# Patient Record
Sex: FEMALE | Race: BLACK OR AFRICAN AMERICAN | Employment: OTHER | RURAL
[De-identification: names, ages, dates, MRNs, and addresses within clinical notes are randomized per-mention and may not be internally consistent; named-entity substitution may affect disease eponyms.]

---

## 2023-11-07 ENCOUNTER — OFFICE VISIT (OUTPATIENT)
Facility: CLINIC | Age: 73
End: 2023-11-07
Payer: COMMERCIAL

## 2023-11-07 VITALS
RESPIRATION RATE: 16 BRPM | DIASTOLIC BLOOD PRESSURE: 81 MMHG | WEIGHT: 198 LBS | OXYGEN SATURATION: 94 % | TEMPERATURE: 98.6 F | HEART RATE: 63 BPM | BODY MASS INDEX: 36.44 KG/M2 | SYSTOLIC BLOOD PRESSURE: 138 MMHG | HEIGHT: 62 IN

## 2023-11-07 DIAGNOSIS — C50.012 MALIGNANT NEOPLASM OF AREOLA OF LEFT BREAST IN FEMALE, UNSPECIFIED ESTROGEN RECEPTOR STATUS (HCC): ICD-10-CM

## 2023-11-07 DIAGNOSIS — C50.012 MALIGNANT NEOPLASM OF NIPPLE OF LEFT BREAST IN FEMALE, UNSPECIFIED ESTROGEN RECEPTOR STATUS (HCC): ICD-10-CM

## 2023-11-07 DIAGNOSIS — E11.65 TYPE 2 DIABETES MELLITUS WITH HYPERGLYCEMIA, WITHOUT LONG-TERM CURRENT USE OF INSULIN (HCC): Primary | ICD-10-CM

## 2023-11-07 DIAGNOSIS — E78.2 MIXED HYPERLIPIDEMIA: ICD-10-CM

## 2023-11-07 DIAGNOSIS — I10 PRIMARY HYPERTENSION: ICD-10-CM

## 2023-11-07 DIAGNOSIS — I10 ESSENTIAL HYPERTENSION: ICD-10-CM

## 2023-11-07 DIAGNOSIS — M47.9 ARTHRITIS OF BACK: ICD-10-CM

## 2023-11-07 PROCEDURE — 3044F HG A1C LEVEL LT 7.0%: CPT | Performed by: FAMILY MEDICINE

## 2023-11-07 PROCEDURE — 3078F DIAST BP <80 MM HG: CPT | Performed by: FAMILY MEDICINE

## 2023-11-07 PROCEDURE — 1123F ACP DISCUSS/DSCN MKR DOCD: CPT | Performed by: FAMILY MEDICINE

## 2023-11-07 PROCEDURE — 99203 OFFICE O/P NEW LOW 30 MIN: CPT | Performed by: FAMILY MEDICINE

## 2023-11-07 PROCEDURE — 3074F SYST BP LT 130 MM HG: CPT | Performed by: FAMILY MEDICINE

## 2023-11-07 RX ORDER — FUROSEMIDE 20 MG/1
20 TABLET ORAL DAILY
Qty: 90 TABLET | Refills: 1 | Status: SHIPPED | OUTPATIENT
Start: 2023-11-07

## 2023-11-07 RX ORDER — ANASTROZOLE 1 MG/1
TABLET ORAL
COMMUNITY
Start: 2023-06-30 | End: 2023-11-07 | Stop reason: SDUPTHER

## 2023-11-07 RX ORDER — FUROSEMIDE 20 MG/1
TABLET ORAL
COMMUNITY
Start: 2023-08-08 | End: 2023-11-07 | Stop reason: SDUPTHER

## 2023-11-07 RX ORDER — CLONIDINE HYDROCHLORIDE 0.1 MG/1
TABLET ORAL
COMMUNITY
Start: 2018-05-11 | End: 2023-11-07 | Stop reason: SDUPTHER

## 2023-11-07 RX ORDER — LOSARTAN POTASSIUM AND HYDROCHLOROTHIAZIDE 25; 100 MG/1; MG/1
TABLET ORAL
COMMUNITY
Start: 2018-04-24 | End: 2023-11-07 | Stop reason: SDUPTHER

## 2023-11-07 RX ORDER — CLONIDINE HYDROCHLORIDE 0.1 MG/1
0.1 TABLET ORAL 2 TIMES DAILY
Qty: 180 TABLET | Refills: 1 | Status: SHIPPED | OUTPATIENT
Start: 2023-11-07

## 2023-11-07 RX ORDER — LOSARTAN POTASSIUM AND HYDROCHLOROTHIAZIDE 25; 100 MG/1; MG/1
1 TABLET ORAL DAILY
Qty: 90 TABLET | Refills: 1 | Status: SHIPPED | OUTPATIENT
Start: 2023-11-07

## 2023-11-07 RX ORDER — CYCLOBENZAPRINE HCL 5 MG
TABLET ORAL
COMMUNITY
Start: 2023-10-11

## 2023-11-07 RX ORDER — ANASTROZOLE 1 MG/1
1 TABLET ORAL DAILY
Qty: 90 TABLET | Refills: 1 | Status: SHIPPED | OUTPATIENT
Start: 2023-11-07

## 2023-11-07 SDOH — ECONOMIC STABILITY: HOUSING INSECURITY
IN THE LAST 12 MONTHS, WAS THERE A TIME WHEN YOU DID NOT HAVE A STEADY PLACE TO SLEEP OR SLEPT IN A SHELTER (INCLUDING NOW)?: YES

## 2023-11-07 SDOH — ECONOMIC STABILITY: FOOD INSECURITY: WITHIN THE PAST 12 MONTHS, YOU WORRIED THAT YOUR FOOD WOULD RUN OUT BEFORE YOU GOT MONEY TO BUY MORE.: NEVER TRUE

## 2023-11-07 SDOH — ECONOMIC STABILITY: FOOD INSECURITY: WITHIN THE PAST 12 MONTHS, THE FOOD YOU BOUGHT JUST DIDN'T LAST AND YOU DIDN'T HAVE MONEY TO GET MORE.: NEVER TRUE

## 2023-11-07 SDOH — ECONOMIC STABILITY: INCOME INSECURITY: HOW HARD IS IT FOR YOU TO PAY FOR THE VERY BASICS LIKE FOOD, HOUSING, MEDICAL CARE, AND HEATING?: NOT HARD AT ALL

## 2023-11-07 ASSESSMENT — PATIENT HEALTH QUESTIONNAIRE - PHQ9
1. LITTLE INTEREST OR PLEASURE IN DOING THINGS: 0
SUM OF ALL RESPONSES TO PHQ QUESTIONS 1-9: 0
SUM OF ALL RESPONSES TO PHQ9 QUESTIONS 1 & 2: 0
2. FEELING DOWN, DEPRESSED OR HOPELESS: 0
SUM OF ALL RESPONSES TO PHQ QUESTIONS 1-9: 0

## 2023-11-07 ASSESSMENT — ANXIETY QUESTIONNAIRES
2. NOT BEING ABLE TO STOP OR CONTROL WORRYING: 1
IF YOU CHECKED OFF ANY PROBLEMS ON THIS QUESTIONNAIRE, HOW DIFFICULT HAVE THESE PROBLEMS MADE IT FOR YOU TO DO YOUR WORK, TAKE CARE OF THINGS AT HOME, OR GET ALONG WITH OTHER PEOPLE: NOT DIFFICULT AT ALL
1. FEELING NERVOUS, ANXIOUS, OR ON EDGE: 1

## 2023-11-09 LAB
ALBUMIN SERPL-MCNC: 4.3 G/DL (ref 3.8–4.8)
ALBUMIN/GLOB SERPL: 1.5 {RATIO} (ref 1.2–2.2)
ALP SERPL-CCNC: 121 IU/L (ref 44–121)
ALT SERPL-CCNC: 24 IU/L (ref 0–32)
AST SERPL-CCNC: 23 IU/L (ref 0–40)
BILIRUB SERPL-MCNC: 0.2 MG/DL (ref 0–1.2)
BUN SERPL-MCNC: 13 MG/DL (ref 8–27)
BUN/CREAT SERPL: 16 (ref 12–28)
CALCIUM SERPL-MCNC: 10.8 MG/DL (ref 8.7–10.3)
CHLORIDE SERPL-SCNC: 101 MMOL/L (ref 96–106)
CHOLEST SERPL-MCNC: 152 MG/DL (ref 100–199)
CO2 SERPL-SCNC: 27 MMOL/L (ref 20–29)
CREAT SERPL-MCNC: 0.81 MG/DL (ref 0.57–1)
EGFRCR SERPLBLD CKD-EPI 2021: 76 ML/MIN/1.73
GLOBULIN SER CALC-MCNC: 2.9 G/DL (ref 1.5–4.5)
GLUCOSE SERPL-MCNC: 94 MG/DL (ref 70–99)
HBA1C MFR BLD: 6.4 % (ref 4.8–5.6)
HDLC SERPL-MCNC: 40 MG/DL
IMP & REVIEW OF LAB RESULTS: NORMAL
LDLC SERPL CALC-MCNC: 88 MG/DL (ref 0–99)
POTASSIUM SERPL-SCNC: 3.6 MMOL/L (ref 3.5–5.2)
PROT SERPL-MCNC: 7.2 G/DL (ref 6–8.5)
SODIUM SERPL-SCNC: 144 MMOL/L (ref 134–144)
SPECIMEN STATUS REPORT: NORMAL
TRIGL SERPL-MCNC: 134 MG/DL (ref 0–149)
VLDLC SERPL CALC-MCNC: 24 MG/DL (ref 5–40)

## 2023-11-10 PROBLEM — C50.912 BREAST CANCER, LEFT BREAST (HCC): Status: ACTIVE | Noted: 2023-11-10

## 2023-11-10 PROBLEM — I10 PRIMARY HYPERTENSION: Status: ACTIVE | Noted: 2023-11-10

## 2023-11-13 ENCOUNTER — CLINICAL DOCUMENTATION (OUTPATIENT)
Age: 73
End: 2023-11-13

## 2023-11-13 NOTE — PROGRESS NOTES
Reached out to patient to clarify reason for referral, maybe looking for an oncologist. She is going to call Dr. Bharath Schmidt office and call back.

## 2023-11-14 ENCOUNTER — OFFICE VISIT (OUTPATIENT)
Age: 73
End: 2023-11-14
Payer: COMMERCIAL

## 2023-11-14 VITALS
HEART RATE: 48 BPM | OXYGEN SATURATION: 92 % | SYSTOLIC BLOOD PRESSURE: 118 MMHG | HEIGHT: 62 IN | TEMPERATURE: 97.8 F | BODY MASS INDEX: 37.21 KG/M2 | DIASTOLIC BLOOD PRESSURE: 66 MMHG | WEIGHT: 202.2 LBS | RESPIRATION RATE: 14 BRPM

## 2023-11-14 DIAGNOSIS — C50.412 CARCINOMA OF UPPER-OUTER QUADRANT OF LEFT BREAST IN FEMALE, ESTROGEN RECEPTOR POSITIVE (HCC): Primary | ICD-10-CM

## 2023-11-14 DIAGNOSIS — Z17.0 CARCINOMA OF UPPER-OUTER QUADRANT OF LEFT BREAST IN FEMALE, ESTROGEN RECEPTOR POSITIVE (HCC): Primary | ICD-10-CM

## 2023-11-14 PROCEDURE — 3078F DIAST BP <80 MM HG: CPT | Performed by: SURGERY

## 2023-11-14 PROCEDURE — 3074F SYST BP LT 130 MM HG: CPT | Performed by: SURGERY

## 2023-11-14 PROCEDURE — 99244 OFF/OP CNSLTJ NEW/EST MOD 40: CPT | Performed by: SURGERY

## 2023-11-14 ASSESSMENT — PATIENT HEALTH QUESTIONNAIRE - PHQ9
2. FEELING DOWN, DEPRESSED OR HOPELESS: 0
1. LITTLE INTEREST OR PLEASURE IN DOING THINGS: 0
SUM OF ALL RESPONSES TO PHQ QUESTIONS 1-9: 0
SUM OF ALL RESPONSES TO PHQ QUESTIONS 1-9: 0
SUM OF ALL RESPONSES TO PHQ9 QUESTIONS 1 & 2: 0
SUM OF ALL RESPONSES TO PHQ QUESTIONS 1-9: 0
SUM OF ALL RESPONSES TO PHQ QUESTIONS 1-9: 0

## 2023-11-14 ASSESSMENT — ENCOUNTER SYMPTOMS
CONSTIPATION: 0
VOMITING: 0
COUGH: 0
EYE PAIN: 0
DIARRHEA: 0
SORE THROAT: 0
STRIDOR: 0
APNEA: 1
WHEEZING: 0
SHORTNESS OF BREATH: 0
NAUSEA: 0
BACK PAIN: 1
BLOOD IN STOOL: 0
ABDOMINAL PAIN: 0

## 2023-11-14 NOTE — PROGRESS NOTES
Subjective:      Patient ID: Jasmin Keating is a 75 y.o. female who comes in for consultation by Ronnie Quiroz MD to establish care for a hx breast cancer      Chief Complaint   Patient presents with    New Patient     Seen at the request of Dr. Siobhan Beck for pain in the left breast, hx of breast cancer. HPI    She was noted on mammogram to have left breast changes in 2021. She underwent bx and subsequent left lumpectomy and SLNB 6/2021 for a Y5qL8X4 % % her2/danitza 0 mixed IDC/ILC Grade 3 carcinoma. She received adjuvant WBRT and is taking anastrozole. She reports some tenderness over the lumpectomy/SLNB sites but no other masses, skin changes, nipple changes or drainage. She had menarche at 14, only pregnancy at 38, menopause in late 45s and did not take HRT. She denies family hx breast/ovarian/pancreatic/prostate ca. Past Medical History:   Diagnosis Date    Prediabetes 2023    Sleep apnea      Past Surgical History:   Procedure Laterality Date    HYSTERECTOMY, VAGINAL       History reviewed. No pertinent family history. Social History     Tobacco Use    Smoking status: Never     Passive exposure: Never    Smokeless tobacco: Never   Vaping Use    Vaping Use: Never used   Substance Use Topics    Alcohol use: Yes     Comment: occ    Drug use: Never     Current Outpatient Medications   Medication Sig    cyclobenzaprine (FLEXERIL) 5 MG tablet     anastrozole (ARIMIDEX) 1 MG tablet Take 1 tablet by mouth daily    losartan-hydroCHLOROthiazide (HYZAAR) 100-25 MG per tablet Take 1 tablet by mouth daily    cloNIDine (CATAPRES) 0.1 MG tablet Take 1 tablet by mouth 2 times daily    furosemide (LASIX) 20 MG tablet Take 1 tablet by mouth daily    diclofenac sodium (VOLTAREN) 1 % GEL Apply 4 g topically (Patient not taking: Reported on 11/14/2023)     No current facility-administered medications for this visit.      Allergies   Allergen Reactions    Amlodipine Besylate      Marked edema per PN dated

## 2023-11-21 ENCOUNTER — TELEPHONE (OUTPATIENT)
Facility: CLINIC | Age: 73
End: 2023-11-21

## 2023-11-21 NOTE — TELEPHONE ENCOUNTER
----- Message from Reema oWodward sent at 11/13/2023  9:59 AM EST -----  Subject: Message to Provider    QUESTIONS  Information for Provider? pt states she is needing a mammogram before her   appt with gernal surgery on the 14th but pt is confused on why she is   seeing surgery an not oncology   ---------------------------------------------------------------------------  --------------  CALL BACK INFO  [phone]; OK to leave message on voicemail  ---------------------------------------------------------------------------  --------------  SCRIPT ANSWERS  Relationship to Patient? Self

## 2023-11-22 DIAGNOSIS — C50.012 MALIGNANT NEOPLASM OF NIPPLE OF LEFT BREAST IN FEMALE, UNSPECIFIED ESTROGEN RECEPTOR STATUS (HCC): Primary | ICD-10-CM

## 2023-12-08 ENCOUNTER — TELEPHONE (OUTPATIENT)
Facility: CLINIC | Age: 73
End: 2023-12-08

## 2024-01-05 ENCOUNTER — CLINICAL DOCUMENTATION (OUTPATIENT)
Facility: CLINIC | Age: 74
End: 2024-01-05

## 2024-01-19 NOTE — PROGRESS NOTES
Velia Mcintyre is a 73 y.o. female here for new patient appt for left breast cancer.  Diagnosed in 2021. Having tenderness in left breast. She is taking Anastrozole.  Mammogram done 1/3/24.   Pt here alone today.    1. Have you been to the ER, urgent care clinic since your last visit?  Hospitalized since your last visit?  New Pt    2. Have you seen or consulted any other health care providers outside of the [facility] since your last visit?  Include any pap smears or colon screening. New Pt

## 2024-01-22 ENCOUNTER — OFFICE VISIT (OUTPATIENT)
Age: 74
End: 2024-01-22
Payer: COMMERCIAL

## 2024-01-22 VITALS
HEART RATE: 66 BPM | BODY MASS INDEX: 37.94 KG/M2 | OXYGEN SATURATION: 95 % | HEIGHT: 62 IN | SYSTOLIC BLOOD PRESSURE: 168 MMHG | TEMPERATURE: 98.2 F | DIASTOLIC BLOOD PRESSURE: 82 MMHG | WEIGHT: 206.2 LBS

## 2024-01-22 DIAGNOSIS — C50.012 MALIGNANT NEOPLASM OF AREOLA OF LEFT BREAST IN FEMALE, UNSPECIFIED ESTROGEN RECEPTOR STATUS (HCC): Primary | ICD-10-CM

## 2024-01-22 DIAGNOSIS — M81.0 AGE-RELATED OSTEOPOROSIS WITHOUT CURRENT PATHOLOGICAL FRACTURE: ICD-10-CM

## 2024-01-22 PROCEDURE — 3079F DIAST BP 80-89 MM HG: CPT | Performed by: INTERNAL MEDICINE

## 2024-01-22 PROCEDURE — 3077F SYST BP >= 140 MM HG: CPT | Performed by: INTERNAL MEDICINE

## 2024-01-22 PROCEDURE — 99204 OFFICE O/P NEW MOD 45 MIN: CPT | Performed by: INTERNAL MEDICINE

## 2024-01-22 PROCEDURE — 1123F ACP DISCUSS/DSCN MKR DOCD: CPT | Performed by: INTERNAL MEDICINE

## 2024-01-22 RX ORDER — ANASTROZOLE 1 MG/1
1 TABLET ORAL DAILY
Qty: 90 TABLET | Refills: 5 | Status: SHIPPED | OUTPATIENT
Start: 2024-01-22

## 2024-01-22 NOTE — PROGRESS NOTES
[facility]  at [facility]  [address], [address] [facility] [address], [address]  [place] [address]  [phone]      Oncology Consultation Note        Patient: Velia Mcintyre MRN: [id number]  SSN: [id number]    YOB: 1950  Age: 73 y.o.  Sex: female      Subjective:      Velia Mcintyre is a 73 y.o. female who I am establishing her care for left breast. She was treated at [facility]. She was noted on mammogram to have left breast changes in 2021.  She underwent bx and subsequent left lumpectomy and SLNB 6/2021 for a H4qI1Y6 % % her2/danitza 0 mixed IDC/ILC Grade 3 carcinoma.   She received adjuvant WBRT and is taking anastrozole.      She reports some tenderness over the lumpectomy/SLNB sites but no other masses, skin changes, nipple changes or drainage.  She had menarche at 13, only pregnancy at 35, menopause in late 40s and did not take HRT.  She denies family hx breast/ovarian/pancreatic/prostate ca.       Review of Systems:    Constitutional: negative  Eyes: negative  Ears, Nose, Mouth, Throat, and Face: negative  Respiratory: negative  Cardiovascular: negative  Gastrointestinal: negative  Genitourinary:negative  Integument/Breast: negative  Hematologic/Lymphatic: negative  Musculoskeletal:negative  Neurological: negative        Past Medical History:   Diagnosis Date    Breast cancer (HCC) 2021    Prediabetes 2023    Sleep apnea      Past Surgical History:   Procedure Laterality Date    HYSTERECTOMY, VAGINAL        History reviewed. No pertinent family history.  Social History     Tobacco Use    Smoking status: Never     Passive exposure: Never    Smokeless tobacco: Never   Substance Use Topics    Alcohol use: Yes     Comment: occ      Prior to Admission medications    Medication Sig Start Date End Date Taking? Authorizing Provider   anastrozole (ARIMIDEX) 1 MG tablet Take 1 tablet by mouth daily 11/7/23  Yes Baldomero Stinson MD

## 2024-01-23 ENCOUNTER — CLINICAL DOCUMENTATION (OUTPATIENT)
Age: 74
End: 2024-01-23

## 2024-01-23 NOTE — PROGRESS NOTES
Oncology Social Work  Psychosocial Assessment    Reason for Assessment:      [] Social Work Referral [x] Initial Assessment [] New Diagnosis [x] Other    [facility]/23/2024    12:00 PM   Demographics   Marital Status Single       Sources of Information:    [x]Patient  []Family  [x]Staff  [x]Medical Record    Mental Status:    [x]Alert  []Lethargic  []Unresponsive   [] Unable to assess   Oriented to:  [x]Person  [x]Place  [x]Time  [x]Situation      Relationship Status:  []Single  []  []Significant Other/Life Partner  [x]  []  []    Living Circumstances:  [x]Lives Alone  []Family/Significant Other in Household  []Roommates  []Children in the Home  []Paid Caregivers  []Assisted Living Facility/Group Home  []Skilled Nursing Facility  []Homeless  []Incarcerated  []Environmental/Care Concerns  []Other:    Employment Status:  []Employed Full-time []Employed Part-time []Homemaker  [x] Retired [] Short-Term Disability [] Long-Term Disability  [] Unemployed   [] SSI   [] SSDI  [] Self-Employment    Barriers to Learning:    []Language  []Developmental  []Cognitive  []Altered Mental Status  []Visual/Hearing Impairment  []Unable to Read/Write  []Motivational  [] Challenges Understanding Medical Jargon [x]No Barriers Identified      Financial/Legal Concerns:    []Uninsured  []Limited Income/Resources  []Non-Citizen  []Food Insecurity [x]No Concerns Identified   []Other:    Congregation/Spiritual/Existential:  Does patient have any spiritual or Mormon beliefs? [x] Yes [] No  Is the patient involved in a spiritual, dylan or Mormon community? [x] Yes [] No  Patient expressing spiritual/existential angst? [] Yes [x] No  Notes:    Support System:    Identified Support Person/Group:  []Strong  [x]Fair  []Limited    Coping with Illness:   []  Coping Well  [] Challenges Coping with Serious Illness [] Situational Depression [] Situational Anxiety [] Anticipatory Grief  [] Recent Loss []

## 2024-01-28 NOTE — PROGRESS NOTES
Subjective:     Velia Mcintyre is a 73 y.o. female seen for follow-up of diabetes.     She has had hypoglycemic attacks. .no  Blood sugar control has been ok    Hemoglobin A1C   Date Value Ref Range Status   11/08/2023 6.4 (H) 4.8 - 5.6 % Final     Comment:              Prediabetes: 5.7 - 6.4           Diabetes: >6.4           Glycemic control for adults with diabetes: <7.0       Lab Results   Component Value Date/Time     11/08/2023 01:35 PM    K 3.6 11/08/2023 01:35 PM     11/08/2023 01:35 PM    CO2 27 11/08/2023 01:35 PM    BUN 13 11/08/2023 01:35 PM    CREATININE 0.81 11/08/2023 01:35 PM    GLUCOSE 94 11/08/2023 01:35 PM    CALCIUM 10.8 11/08/2023 01:35 PM    LABGLOM 76 11/08/2023 01:35 PM          She has diabetes, hypertension, and hyperlipidemia.    Velai Mcintyre has the additional concern of fell x2 walking couldn't stop toppled over recliner, pain in right knee since    Reports taking blood pressure medications without side affects. No complaints of exertional chest pain, excessive shortness of breath or focal weakness. Minimal swelling in lower legs or dizziness with standing.      Diet and Lifestyle: generally follows a low fat low cholesterol diet, low-carb .    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, estrogen receptor positive (HCC) 11/14/2023    Primary hypertension 11/10/2023    Breast cancer, left breast (HCC) 11/10/2023    Type 2 diabetes mellitus with hyperglycemia, without long-term current use of insulin (HCC) 11/07/2023     Allergies   Allergen Reactions    Amlodipine Besylate      Marked edema per PN dated October 8, 2013     Past Medical History:   Diagnosis Date    Breast cancer (HCC) 2021    Prediabetes 2023    Sleep apnea      Social History     Tobacco Use    Smoking status: Never     Passive exposure: Never    Smokeless tobacco: Never   Substance Use Topics    Alcohol use: Yes     Comment: occ

## 2024-01-29 ENCOUNTER — OFFICE VISIT (OUTPATIENT)
Facility: CLINIC | Age: 74
End: 2024-01-29
Payer: COMMERCIAL

## 2024-01-29 VITALS
RESPIRATION RATE: 16 BRPM | TEMPERATURE: 97.4 F | HEART RATE: 52 BPM | WEIGHT: 206 LBS | OXYGEN SATURATION: 95 % | DIASTOLIC BLOOD PRESSURE: 76 MMHG | HEIGHT: 62 IN | SYSTOLIC BLOOD PRESSURE: 139 MMHG | BODY MASS INDEX: 37.91 KG/M2

## 2024-01-29 DIAGNOSIS — E11.65 TYPE 2 DIABETES MELLITUS WITH HYPERGLYCEMIA, WITHOUT LONG-TERM CURRENT USE OF INSULIN (HCC): Primary | ICD-10-CM

## 2024-01-29 DIAGNOSIS — M47.9 ARTHRITIS OF BACK: ICD-10-CM

## 2024-01-29 DIAGNOSIS — I10 PRIMARY HYPERTENSION: ICD-10-CM

## 2024-01-29 DIAGNOSIS — C50.012 MALIGNANT NEOPLASM OF NIPPLE OF LEFT BREAST IN FEMALE, UNSPECIFIED ESTROGEN RECEPTOR STATUS (HCC): ICD-10-CM

## 2024-01-29 DIAGNOSIS — S89.91XA KNEE INJURY, RIGHT, INITIAL ENCOUNTER: ICD-10-CM

## 2024-01-29 PROCEDURE — 3078F DIAST BP <80 MM HG: CPT | Performed by: FAMILY MEDICINE

## 2024-01-29 PROCEDURE — 99214 OFFICE O/P EST MOD 30 MIN: CPT | Performed by: FAMILY MEDICINE

## 2024-01-29 PROCEDURE — 1123F ACP DISCUSS/DSCN MKR DOCD: CPT | Performed by: FAMILY MEDICINE

## 2024-01-29 PROCEDURE — 3075F SYST BP GE 130 - 139MM HG: CPT | Performed by: FAMILY MEDICINE

## 2024-01-31 LAB — HBA1C MFR BLD HPLC: 6 %

## 2024-02-01 ENCOUNTER — TELEPHONE (OUTPATIENT)
Facility: CLINIC | Age: 74
End: 2024-02-01

## 2024-02-01 NOTE — TELEPHONE ENCOUNTER
----- Message from Alejandro Martínez sent at 2/1/2024 11:41 AM EST -----  Subject: Results Request    QUESTIONS  Results: X-RAY RIGHT KNEE- IN PAIN, WANTING TO KNOW NEXT STEP; Ordered by:   Baldomero Stinson   Date Performed: 2024-01-31  ---------------------------------------------------------------------------  --------------  CALL BACK INFO    [phone]; OK to leave message on voicemail  ---------------------------------------------------------------------------  --------------

## 2024-02-06 ENCOUNTER — TELEPHONE (OUTPATIENT)
Facility: CLINIC | Age: 74
End: 2024-02-06

## 2024-03-21 DIAGNOSIS — E11.65 TYPE 2 DIABETES MELLITUS WITH HYPERGLYCEMIA, WITHOUT LONG-TERM CURRENT USE OF INSULIN (HCC): ICD-10-CM

## 2024-03-21 DIAGNOSIS — I10 PRIMARY HYPERTENSION: ICD-10-CM

## 2024-04-04 ENCOUNTER — HOSPITAL ENCOUNTER (OUTPATIENT)
Facility: HOSPITAL | Age: 74
Discharge: HOME | End: 2024-04-04
Attending: INTERNAL MEDICINE
Payer: COMMERCIAL

## 2024-04-04 DIAGNOSIS — M81.0 AGE-RELATED OSTEOPOROSIS WITHOUT CURRENT PATHOLOGICAL FRACTURE: ICD-10-CM

## 2024-04-04 PROCEDURE — 77080 DXA BONE DENSITY AXIAL: CPT

## 2024-04-08 NOTE — PROGRESS NOTES
Subjective:     Velia Mcintyre is a 73 y.o. female seen for follow-up of diabetes.     She has had hypoglycemic attacks. .no  Blood sugar control has been asks about her DM, currently diet control    Hemoglobin A1C   Date Value Ref Range Status   11/08/2023 6.4 (H) 4.8 - 5.6 % Final     Comment:              Prediabetes: 5.7 - 6.4           Diabetes: >6.4           Glycemic control for adults with diabetes: <7.0       Lab Results   Component Value Date/Time     11/08/2023 01:35 PM    K 3.6 11/08/2023 01:35 PM     11/08/2023 01:35 PM    CO2 27 11/08/2023 01:35 PM    BUN 13 11/08/2023 01:35 PM    CREATININE 0.81 11/08/2023 01:35 PM    GLUCOSE 94 11/08/2023 01:35 PM    CALCIUM 10.8 11/08/2023 01:35 PM    LABGLOM 76 11/08/2023 01:35 PM          She has diabetes and hypertension.    Velia Mcintyre has the additional concern of what can I do to help me with DM?    Reports taking blood pressure medications without side affects. No complaints of exertional chest pain, excessive shortness of breath or focal weakness. Some swelling in lower legs no dizziness with standing.      Diet and Lifestyle: exercises regularly, watches carbs .    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, estrogen receptor positive (HCC) 11/14/2023    Primary hypertension 11/10/2023    Breast cancer, left breast (HCC) 11/10/2023    Type 2 diabetes mellitus with hyperglycemia, without long-term current use of insulin (HCC) 11/07/2023     Allergies   Allergen Reactions    Amlodipine Besylate      Marked edema per PN dated October 8, 2013     Past Medical History:   Diagnosis Date    Breast cancer (HCC) 2021    Prediabetes 2023    Sleep apnea      Social History     Tobacco Use    Smoking status: Never     Passive exposure: Never    Smokeless tobacco: Never   Substance Use Topics    Alcohol use: Yes     Comment: occ             Review of Systems  Pertinent items are noted

## 2024-04-09 ENCOUNTER — OFFICE VISIT (OUTPATIENT)
Facility: CLINIC | Age: 74
End: 2024-04-09
Payer: COMMERCIAL

## 2024-04-09 VITALS
OXYGEN SATURATION: 95 % | BODY MASS INDEX: 37.36 KG/M2 | HEART RATE: 65 BPM | WEIGHT: 203 LBS | SYSTOLIC BLOOD PRESSURE: 132 MMHG | HEIGHT: 62 IN | TEMPERATURE: 97.7 F | RESPIRATION RATE: 16 BRPM | DIASTOLIC BLOOD PRESSURE: 72 MMHG

## 2024-04-09 DIAGNOSIS — I10 ESSENTIAL HYPERTENSION: ICD-10-CM

## 2024-04-09 DIAGNOSIS — E11.65 TYPE 2 DIABETES MELLITUS WITH HYPERGLYCEMIA, WITHOUT LONG-TERM CURRENT USE OF INSULIN (HCC): Primary | ICD-10-CM

## 2024-04-09 DIAGNOSIS — I10 PRIMARY HYPERTENSION: ICD-10-CM

## 2024-04-09 DIAGNOSIS — C50.412 CARCINOMA OF UPPER-OUTER QUADRANT OF LEFT BREAST IN FEMALE, ESTROGEN RECEPTOR POSITIVE (HCC): ICD-10-CM

## 2024-04-09 DIAGNOSIS — Z17.0 CARCINOMA OF UPPER-OUTER QUADRANT OF LEFT BREAST IN FEMALE, ESTROGEN RECEPTOR POSITIVE (HCC): ICD-10-CM

## 2024-04-09 PROCEDURE — 1123F ACP DISCUSS/DSCN MKR DOCD: CPT | Performed by: FAMILY MEDICINE

## 2024-04-09 PROCEDURE — G2211 COMPLEX E/M VISIT ADD ON: HCPCS | Performed by: FAMILY MEDICINE

## 2024-04-09 PROCEDURE — 3075F SYST BP GE 130 - 139MM HG: CPT | Performed by: FAMILY MEDICINE

## 2024-04-09 PROCEDURE — 99214 OFFICE O/P EST MOD 30 MIN: CPT | Performed by: FAMILY MEDICINE

## 2024-04-09 PROCEDURE — 3044F HG A1C LEVEL LT 7.0%: CPT | Performed by: FAMILY MEDICINE

## 2024-04-09 PROCEDURE — 3078F DIAST BP <80 MM HG: CPT | Performed by: FAMILY MEDICINE

## 2024-04-09 RX ORDER — CLONIDINE HYDROCHLORIDE 0.1 MG/1
0.1 TABLET ORAL 2 TIMES DAILY
Qty: 180 TABLET | Refills: 3 | Status: SHIPPED | OUTPATIENT
Start: 2024-04-09

## 2024-04-09 RX ORDER — FUROSEMIDE 20 MG/1
20 TABLET ORAL DAILY
Qty: 90 TABLET | Refills: 3 | Status: SHIPPED | OUTPATIENT
Start: 2024-04-09

## 2024-04-09 RX ORDER — LOSARTAN POTASSIUM AND HYDROCHLOROTHIAZIDE 25; 100 MG/1; MG/1
1 TABLET ORAL DAILY
Qty: 90 TABLET | Refills: 3 | Status: SHIPPED | OUTPATIENT
Start: 2024-04-09

## 2024-04-09 SDOH — ECONOMIC STABILITY: FOOD INSECURITY: WITHIN THE PAST 12 MONTHS, YOU WORRIED THAT YOUR FOOD WOULD RUN OUT BEFORE YOU GOT MONEY TO BUY MORE.: NEVER TRUE

## 2024-04-09 SDOH — ECONOMIC STABILITY: INCOME INSECURITY: HOW HARD IS IT FOR YOU TO PAY FOR THE VERY BASICS LIKE FOOD, HOUSING, MEDICAL CARE, AND HEATING?: NOT HARD AT ALL

## 2024-04-09 SDOH — ECONOMIC STABILITY: FOOD INSECURITY: WITHIN THE PAST 12 MONTHS, THE FOOD YOU BOUGHT JUST DIDN'T LAST AND YOU DIDN'T HAVE MONEY TO GET MORE.: NEVER TRUE

## 2024-04-09 SDOH — ECONOMIC STABILITY: HOUSING INSECURITY
IN THE LAST 12 MONTHS, WAS THERE A TIME WHEN YOU DID NOT HAVE A STEADY PLACE TO SLEEP OR SLEPT IN A SHELTER (INCLUDING NOW)?: NO

## 2024-04-09 ASSESSMENT — PATIENT HEALTH QUESTIONNAIRE - PHQ9
2. FEELING DOWN, DEPRESSED OR HOPELESS: SEVERAL DAYS
1. LITTLE INTEREST OR PLEASURE IN DOING THINGS: SEVERAL DAYS
SUM OF ALL RESPONSES TO PHQ QUESTIONS 1-9: 2
SUM OF ALL RESPONSES TO PHQ QUESTIONS 1-9: 2
SUM OF ALL RESPONSES TO PHQ9 QUESTIONS 1 & 2: 2
SUM OF ALL RESPONSES TO PHQ QUESTIONS 1-9: 2
SUM OF ALL RESPONSES TO PHQ QUESTIONS 1-9: 2

## 2024-04-09 ASSESSMENT — ANXIETY QUESTIONNAIRES
2. NOT BEING ABLE TO STOP OR CONTROL WORRYING: SEVERAL DAYS
1. FEELING NERVOUS, ANXIOUS, OR ON EDGE: SEVERAL DAYS
IF YOU CHECKED OFF ANY PROBLEMS ON THIS QUESTIONNAIRE, HOW DIFFICULT HAVE THESE PROBLEMS MADE IT FOR YOU TO DO YOUR WORK, TAKE CARE OF THINGS AT HOME, OR GET ALONG WITH OTHER PEOPLE: NOT DIFFICULT AT ALL

## 2024-04-09 NOTE — PROGRESS NOTES
Chief Complaint   Patient presents with    Diabetes    Hypertension     Patient has not been out of the country in (14 months), NO diarrhea, NO cough, NO chest conjestion, NO temp.  Pt has not been around anyone with these symptoms.     Health Maintenance reviewed.    I have reviewed the patient's medical history in detail and updated the computerized patient record.    \"Have you been to the ER, urgent care clinic since your last visit? No  Hospitalized since your last visit?\"    no    “Have you seen or consulted any other health care providers outside of [facility] since your last visit?”    no

## 2024-04-10 LAB
BUN SERPL-MCNC: 14 MG/DL (ref 8–27)
BUN/CREAT SERPL: 16 (ref 12–28)
CALCIUM SERPL-MCNC: 10.7 MG/DL (ref 8.7–10.3)
CHLORIDE SERPL-SCNC: 105 MMOL/L (ref 96–106)
CO2 SERPL-SCNC: 24 MMOL/L (ref 20–29)
CREAT SERPL-MCNC: 0.86 MG/DL (ref 0.57–1)
EGFRCR SERPLBLD CKD-EPI 2021: 71 ML/MIN/1.73
GLUCOSE SERPL-MCNC: 93 MG/DL (ref 70–99)
HBA1C MFR BLD: 6.4 % (ref 4.8–5.6)
POTASSIUM SERPL-SCNC: 4.2 MMOL/L (ref 3.5–5.2)
SODIUM SERPL-SCNC: 143 MMOL/L (ref 134–144)

## 2024-08-27 DIAGNOSIS — M47.9 ARTHRITIS OF BACK: ICD-10-CM

## 2024-08-27 PROCEDURE — 72100 X-RAY EXAM L-S SPINE 2/3 VWS: CPT | Performed by: FAMILY MEDICINE

## 2024-10-04 ENCOUNTER — TELEPHONE (OUTPATIENT)
Age: 74
End: 2024-10-04

## 2024-10-04 NOTE — TELEPHONE ENCOUNTER
Patient has an appt to see Dr Vargas on 10/9/2024. She was supposed to have a mammogram in June 2024, I called her to see if she has had this done somewhere else. If not I will see if Dr Vargas would like to put a order in and let her get the Mammo done before seeing her or if he would like for her to come to her appt on 10/9/24 then order the mammo.   I left a message for her to call me back.

## 2024-10-30 ENCOUNTER — TELEPHONE (OUTPATIENT)
Age: 74
End: 2024-10-30

## 2024-10-30 NOTE — TELEPHONE ENCOUNTER
Spoke with patient she was just released from the hospital. She said she was admitted for 6 days d/t SOB, her HR and BP. She wanted to cancel her appt for Friday until she can get a little stronger. She rescheduled for 11/26/24. She had a mammo on 1/3/2024 at [facility] results are in EPIC. I will speak with Dr Vargas to see if she needs another Mammogram done prior to her appt and let her know.

## 2024-10-31 DIAGNOSIS — S89.91XA KNEE INJURY, RIGHT, INITIAL ENCOUNTER: ICD-10-CM

## 2024-10-31 NOTE — TELEPHONE ENCOUNTER
Left message for patient letting her know that Dr Vargas would like to see her in the office before placing orders for imaging

## 2024-11-01 NOTE — TELEPHONE ENCOUNTER
Patient made aware that Dr Vargas wants to see her before placing any imaging orders, she said she understood. I reminded her that her appt is on 11/26/24 @ 10am

## 2025-03-25 ENCOUNTER — OFFICE VISIT (OUTPATIENT)
Age: 75
End: 2025-03-25
Payer: COMMERCIAL

## 2025-03-25 VITALS
WEIGHT: 197.2 LBS | OXYGEN SATURATION: 95 % | HEART RATE: 56 BPM | RESPIRATION RATE: 16 BRPM | TEMPERATURE: 97.3 F | BODY MASS INDEX: 36.29 KG/M2 | DIASTOLIC BLOOD PRESSURE: 69 MMHG | SYSTOLIC BLOOD PRESSURE: 116 MMHG | HEIGHT: 62 IN

## 2025-03-25 DIAGNOSIS — C50.412 CARCINOMA OF UPPER-OUTER QUADRANT OF LEFT BREAST IN FEMALE, ESTROGEN RECEPTOR POSITIVE: Primary | ICD-10-CM

## 2025-03-25 DIAGNOSIS — Z17.0 CARCINOMA OF UPPER-OUTER QUADRANT OF LEFT BREAST IN FEMALE, ESTROGEN RECEPTOR POSITIVE: Primary | ICD-10-CM

## 2025-03-25 PROCEDURE — 3078F DIAST BP <80 MM HG: CPT | Performed by: SURGERY

## 2025-03-25 PROCEDURE — 1123F ACP DISCUSS/DSCN MKR DOCD: CPT | Performed by: SURGERY

## 2025-03-25 PROCEDURE — 99213 OFFICE O/P EST LOW 20 MIN: CPT | Performed by: SURGERY

## 2025-03-25 PROCEDURE — 3074F SYST BP LT 130 MM HG: CPT | Performed by: SURGERY

## 2025-03-25 RX ORDER — FUROSEMIDE 40 MG/1
40 TABLET ORAL DAILY
COMMUNITY
Start: 2025-02-23

## 2025-03-25 RX ORDER — TELMISARTAN 80 MG/1
80 TABLET ORAL DAILY
COMMUNITY
Start: 2025-01-31

## 2025-03-25 RX ORDER — CARVEDILOL 3.12 MG/1
3.12 TABLET ORAL 2 TIMES DAILY WITH MEALS
COMMUNITY
Start: 2025-02-23

## 2025-03-25 RX ORDER — ACETAMINOPHEN 500 MG
1000 TABLET ORAL EVERY 6 HOURS PRN
COMMUNITY

## 2025-03-25 RX ORDER — DOXAZOSIN 4 MG/1
4 TABLET ORAL NIGHTLY
COMMUNITY

## 2025-03-25 RX ORDER — SPIRONOLACTONE 25 MG/1
25 TABLET ORAL DAILY
COMMUNITY
Start: 2025-03-10

## 2025-03-25 RX ORDER — LOSARTAN POTASSIUM 50 MG/1
50 TABLET ORAL DAILY
COMMUNITY
Start: 2025-01-03

## 2025-03-25 RX ORDER — LABETALOL 200 MG/1
200 TABLET, FILM COATED ORAL 2 TIMES DAILY
COMMUNITY

## 2025-03-25 ASSESSMENT — PATIENT HEALTH QUESTIONNAIRE - PHQ9
1. LITTLE INTEREST OR PLEASURE IN DOING THINGS: NOT AT ALL
SUM OF ALL RESPONSES TO PHQ QUESTIONS 1-9: 0
2. FEELING DOWN, DEPRESSED OR HOPELESS: NOT AT ALL
SUM OF ALL RESPONSES TO PHQ QUESTIONS 1-9: 0

## 2025-03-25 ASSESSMENT — ENCOUNTER SYMPTOMS
BACK PAIN: 1
WHEEZING: 0
COUGH: 0
EYE PAIN: 0
NAUSEA: 0
BLOOD IN STOOL: 0
APNEA: 1
SHORTNESS OF BREATH: 1
VOMITING: 0
CONSTIPATION: 0
STRIDOR: 0
SORE THROAT: 0
ABDOMINAL PAIN: 0
DIARRHEA: 0

## 2025-03-25 NOTE — PROGRESS NOTES
Identified pt with two pt identifiers (name and ). Reviewed chart in preparation for visit and have obtained necessary documentation.    Velia Mcintyre is a 74 y.o. female Follow-up (Follow up left breast cancer.)  .    Vitals:    25 1129   BP: 116/69   BP Site: Right Upper Arm   Patient Position: Sitting   BP Cuff Size: Large Adult   Pulse: 56   Resp: 16   Temp: 97.3 °F (36.3 °C)   TempSrc: Oral   SpO2: 95%   Weight: 89.4 kg (197 lb 3.2 oz)   Height: 1.562 m (5' 1.5\")          1. Have you been to the ER, urgent care clinic since your last visit?  Hospitalized since your last visit?  no     2. Have you seen or consulted any other health care providers outside of the [facility] since your last visit?  Include any pap smears or colon screening.  no

## 2025-03-25 NOTE — PROGRESS NOTES
Subjective:      Patient ID: Velia Mcintyre is a 74 y.o. female who comes in for follow up by Baldomero Stinson MD to establish care for a hx breast cancer      Chief Complaint   Patient presents with    Follow-up     Follow up left breast cancer.       HPI    She was noted on mammogram to have left breast changes in 2021.  She underwent bx and subsequent left lumpectomy and SLNB 6/2021 for a Y6pP6Z0 % % her2/danitza 0 mixed IDC/ILC Grade 3 carcinoma.   She received adjuvant WBRT and is taking anastrozole.  She reports some tenderness over the lumpectomy/SLNB sites but no other masses, skin changes, nipple changes or drainage.  She had menarche at 13, only pregnancy at 35, menopause in late 40s and did not take HRT.  She denies family hx breast/ovarian/pancreatic/prostate ca.  She had a mammogram 8/2/2024 that was BIRADS 2.    Past Medical History:   Diagnosis Date    Breast cancer (HCC) 2021    Prediabetes 2023    Sleep apnea      Past Surgical History:   Procedure Laterality Date    HYSTERECTOMY, VAGINAL       No family history on file.  Social History     Tobacco Use    Smoking status: Never     Passive exposure: Never    Smokeless tobacco: Never   Vaping Use    Vaping status: Never Used   Substance Use Topics    Alcohol use: Yes     Comment: occ    Drug use: Never     Current Outpatient Medications   Medication Sig    carvedilol (COREG) 3.125 MG tablet Take 1 tablet by mouth 2 times daily (with meals)    losartan (COZAAR) 50 MG tablet Take 1 tablet by mouth daily    telmisartan (MICARDIS) 80 MG tablet Take 1 tablet by mouth daily    furosemide (LASIX) 40 MG tablet Take 1 tablet by mouth daily    spironolactone (ALDACTONE) 25 MG tablet Take 1 tablet by mouth daily    doxazosin (CARDURA) 4 MG tablet Take 1 tablet by mouth nightly    acetaminophen (TYLENOL) 500 MG tablet Take 2 tablets by mouth every 6 hours as needed    labetalol (NORMODYNE) 200 MG tablet Take 1 tablet by mouth 2 times daily    cloNIDine